# Patient Record
Sex: FEMALE | Race: WHITE | NOT HISPANIC OR LATINO
[De-identification: names, ages, dates, MRNs, and addresses within clinical notes are randomized per-mention and may not be internally consistent; named-entity substitution may affect disease eponyms.]

---

## 2018-08-20 ENCOUNTER — RESULT REVIEW (OUTPATIENT)
Age: 73
End: 2018-08-20

## 2019-08-22 ENCOUNTER — APPOINTMENT (OUTPATIENT)
Dept: GASTROENTEROLOGY | Facility: CLINIC | Age: 74
End: 2019-08-22
Payer: COMMERCIAL

## 2019-08-22 VITALS
HEIGHT: 61 IN | HEART RATE: 68 BPM | SYSTOLIC BLOOD PRESSURE: 121 MMHG | WEIGHT: 140 LBS | BODY MASS INDEX: 26.43 KG/M2 | DIASTOLIC BLOOD PRESSURE: 63 MMHG

## 2019-08-22 PROCEDURE — 99214 OFFICE O/P EST MOD 30 MIN: CPT

## 2019-08-22 NOTE — PHYSICAL EXAM
[General Appearance - In No Acute Distress] : in no acute distress [General Appearance - Alert] : alert [Sclera] : the sclera and conjunctiva were normal [PERRL With Normal Accommodation] : pupils were equal in size, round, and reactive to light [Extraocular Movements] : extraocular movements were intact [Oropharynx] : the oropharynx was normal [Outer Ear] : the ears and nose were normal in appearance [Neck Appearance] : the appearance of the neck was normal [Jugular Venous Distention Increased] : there was no jugular-venous distention [Neck Cervical Mass (___cm)] : no neck mass was observed [Thyroid Diffuse Enlargement] : the thyroid was not enlarged [Thyroid Nodule] : there were no palpable thyroid nodules [Heart Rate And Rhythm] : heart rate was normal and rhythm regular [Auscultation Breath Sounds / Voice Sounds] : lungs were clear to auscultation bilaterally [Heart Sounds] : normal S1 and S2 [Heart Sounds Gallop] : no gallops [Murmurs] : no murmurs [Heart Sounds Pericardial Friction Rub] : no pericardial rub [Bowel Sounds] : normal bowel sounds [Abdomen Soft] : soft [] : no hepato-splenomegaly [Abdomen Tenderness] : non-tender [No CVA Tenderness] : no ~M costovertebral angle tenderness [Abdomen Mass (___ Cm)] : no abdominal mass palpated [Abnormal Walk] : normal gait [No Spinal Tenderness] : no spinal tenderness [Musculoskeletal - Swelling] : no joint swelling seen [Motor Tone] : muscle strength and tone were normal [Nail Clubbing] : no clubbing  or cyanosis of the fingernails

## 2019-08-22 NOTE — HISTORY OF PRESENT ILLNESS
[FreeTextEntry1] : Patient has a history of elevated liver function tests and sonogram showed fatty liver most recent liver tests are within normal limits she has been able to watch her weight and alcohol use she consumes 1-2 drinks per night on occasion no dysphasia no heartburn no abdominal cramps no rectal bleeding

## 2019-08-22 NOTE — ASSESSMENT
[FreeTextEntry1] : Sonogram suggesting fatty liver we'll continue to avoid excess alcohol and maintain her weight at this level followup in 6 months

## 2020-06-04 ENCOUNTER — APPOINTMENT (OUTPATIENT)
Dept: GASTROENTEROLOGY | Facility: CLINIC | Age: 75
End: 2020-06-04
Payer: COMMERCIAL

## 2020-06-04 PROCEDURE — 99214 OFFICE O/P EST MOD 30 MIN: CPT | Mod: 95

## 2020-06-04 RX ORDER — LORATADINE 5 MG/5 ML
SOLUTION, ORAL ORAL
Refills: 0 | Status: ACTIVE | COMMUNITY

## 2020-06-04 RX ORDER — ATORVASTATIN CALCIUM 20 MG/1
20 TABLET, FILM COATED ORAL
Refills: 0 | Status: ACTIVE | COMMUNITY

## 2020-06-04 RX ORDER — ENALAPRIL MALEATE 5 MG/1
5 TABLET ORAL
Refills: 0 | Status: ACTIVE | COMMUNITY

## 2020-06-04 RX ORDER — ASPIRIN 81 MG/1
81 TABLET, CHEWABLE ORAL
Refills: 0 | Status: ACTIVE | COMMUNITY

## 2020-06-04 RX ORDER — SOFT LENS ADJUNCTIVE SOLUTIONS
DROPS MISCELLANEOUS
Refills: 0 | Status: ACTIVE | COMMUNITY

## 2020-06-04 RX ORDER — BISMUTH SUBSALICYLATE 525 MG/1
TABLET ORAL
Refills: 0 | Status: ACTIVE | COMMUNITY

## 2021-03-25 ENCOUNTER — APPOINTMENT (OUTPATIENT)
Dept: GASTROENTEROLOGY | Facility: CLINIC | Age: 76
End: 2021-03-25
Payer: COMMERCIAL

## 2021-03-25 VITALS
HEART RATE: 73 BPM | WEIGHT: 130.31 LBS | DIASTOLIC BLOOD PRESSURE: 64 MMHG | BODY MASS INDEX: 24.6 KG/M2 | SYSTOLIC BLOOD PRESSURE: 126 MMHG | HEIGHT: 61 IN

## 2021-03-25 PROCEDURE — 99214 OFFICE O/P EST MOD 30 MIN: CPT

## 2021-03-25 PROCEDURE — 99072 ADDL SUPL MATRL&STAF TM PHE: CPT

## 2021-03-25 RX ORDER — SODIUM SULFATE, POTASSIUM SULFATE, MAGNESIUM SULFATE 17.5; 3.13; 1.6 G/ML; G/ML; G/ML
17.5-3.13-1.6 SOLUTION, CONCENTRATE ORAL
Qty: 1 | Refills: 0 | Status: ACTIVE | COMMUNITY
Start: 2021-03-25 | End: 1900-01-01

## 2021-03-25 NOTE — ASSESSMENT
[FreeTextEntry1] : #1  blood per rectum, likely secondary to constipation. Will continue use of milk of magnesia caplets and schedule colonoscopy as her last one was 5 years ago\par #2 non-alcoholic fatty liver disease nafld we'll check abdominal sonogram. Recent blood work was normal. We'll check-[place] fibro

## 2021-03-25 NOTE — HISTORY OF PRESENT ILLNESS
[FreeTextEntry1] : Patient has been having occasional bright red blood per rectum. The amount is small. The frequency is one time a week. She denies any change of bowel habits. She takes magnesium, successfully for long-standing constipation. She has lost weight intentionally and has a history of fatty liver, which was felt secondary to alcohol use, which he has now decreased. She generally is feeling well. She uses avoidance for reflux and does not need any H2 blocker, which she has in the past

## 2021-03-25 NOTE — PHYSICAL EXAM
[General Appearance - Alert] : alert [General Appearance - In No Acute Distress] : in no acute distress [Sclera] : the sclera and conjunctiva were normal [PERRL With Normal Accommodation] : pupils were equal in size, round, and reactive to light [Extraocular Movements] : extraocular movements were intact [Outer Ear] : the ears and nose were normal in appearance [Oropharynx] : the oropharynx was normal [Neck Appearance] : the appearance of the neck was normal [Neck Cervical Mass (___cm)] : no neck mass was observed [Jugular Venous Distention Increased] : there was no jugular-venous distention [Thyroid Diffuse Enlargement] : the thyroid was not enlarged [Thyroid Nodule] : there were no palpable thyroid nodules [Auscultation Breath Sounds / Voice Sounds] : lungs were clear to auscultation bilaterally [Heart Rate And Rhythm] : heart rate was normal and rhythm regular [Heart Sounds] : normal S1 and S2 [Heart Sounds Gallop] : no gallops [Murmurs] : no murmurs [Heart Sounds Pericardial Friction Rub] : no pericardial rub [Full Pulse] : the pedal pulses are present [Edema] : there was no peripheral edema [Bowel Sounds] : normal bowel sounds [Abdomen Soft] : soft [Abdomen Tenderness] : non-tender [] : no hepato-splenomegaly [Abdomen Mass (___ Cm)] : no abdominal mass palpated [No CVA Tenderness] : no ~M costovertebral angle tenderness [No Spinal Tenderness] : no spinal tenderness [Abnormal Walk] : normal gait [Nail Clubbing] : no clubbing  or cyanosis of the fingernails [Musculoskeletal - Swelling] : no joint swelling seen [Motor Tone] : muscle strength and tone were normal [Deep Tendon Reflexes (DTR)] : deep tendon reflexes were 2+ and symmetric [Sensation] : the sensory exam was normal to light touch and pinprick [No Focal Deficits] : no focal deficits [Oriented To Time, Place, And Person] : oriented to person, place, and time [Impaired Insight] : insight and judgment were intact [Affect] : the affect was normal

## 2021-04-15 RX ORDER — SODIUM SULFATE, MAGNESIUM SULFATE, AND POTASSIUM CHLORIDE 17.75; 2.7; 2.25 G/1; G/1; G/1
1479-225-188 TABLET ORAL
Qty: 24 | Refills: 0 | Status: ACTIVE | COMMUNITY
Start: 2021-04-15 | End: 1900-01-01

## 2021-04-17 ENCOUNTER — APPOINTMENT (OUTPATIENT)
Dept: DISASTER EMERGENCY | Facility: CLINIC | Age: 76
End: 2021-04-17

## 2021-04-17 DIAGNOSIS — Z01.818 ENCOUNTER FOR OTHER PREPROCEDURAL EXAMINATION: ICD-10-CM

## 2021-04-17 LAB — SARS-COV-2 N GENE NPH QL NAA+PROBE: NOT DETECTED

## 2021-04-21 ENCOUNTER — APPOINTMENT (OUTPATIENT)
Dept: GASTROENTEROLOGY | Facility: AMBULATORY MEDICAL SERVICES | Age: 76
End: 2021-04-21
Payer: COMMERCIAL

## 2021-04-21 PROCEDURE — 45378 DIAGNOSTIC COLONOSCOPY: CPT

## 2021-05-20 ENCOUNTER — APPOINTMENT (OUTPATIENT)
Dept: GASTROENTEROLOGY | Facility: CLINIC | Age: 76
End: 2021-05-20
Payer: COMMERCIAL

## 2021-05-20 VITALS
HEART RATE: 71 BPM | HEIGHT: 61 IN | BODY MASS INDEX: 24.73 KG/M2 | DIASTOLIC BLOOD PRESSURE: 70 MMHG | SYSTOLIC BLOOD PRESSURE: 120 MMHG | WEIGHT: 131 LBS | TEMPERATURE: 98 F

## 2021-05-20 PROCEDURE — 99213 OFFICE O/P EST LOW 20 MIN: CPT

## 2021-05-20 NOTE — ASSESSMENT
[FreeTextEntry1] : Elevated alpha-fetoprotein we'll schedule a CAT scan of the abdomen with IV contrast

## 2021-05-20 NOTE — HISTORY OF PRESENT ILLNESS
[FreeTextEntry1] : Patient has been diagnosed with fatty liver as a result of history of hypercholesterolemia, which has been followed and a recent alpha-fetoprotein was minimally elevated at 6.5, normal being 6.1. A sonogram did not show any liver lesion. [place] fibro showed slight elevation of fibrosis.she is feeling quite well.

## 2021-10-12 ENCOUNTER — APPOINTMENT (OUTPATIENT)
Dept: GASTROENTEROLOGY | Facility: CLINIC | Age: 76
End: 2021-10-12
Payer: MEDICARE

## 2021-10-12 VITALS
DIASTOLIC BLOOD PRESSURE: 78 MMHG | BODY MASS INDEX: 27 KG/M2 | HEIGHT: 61 IN | SYSTOLIC BLOOD PRESSURE: 112 MMHG | WEIGHT: 143 LBS | HEART RATE: 65 BPM

## 2021-10-12 PROCEDURE — 99213 OFFICE O/P EST LOW 20 MIN: CPT

## 2021-10-12 NOTE — PHYSICAL EXAM
[General Appearance - Alert] : alert [General Appearance - In No Acute Distress] : in no acute distress [Sclera] : the sclera and conjunctiva were normal [PERRL With Normal Accommodation] : pupils were equal in size, round, and reactive to light [Extraocular Movements] : extraocular movements were intact [Outer Ear] : the ears and nose were normal in appearance [Oropharynx] : the oropharynx was normal [Neck Appearance] : the appearance of the neck was normal [Neck Cervical Mass (___cm)] : no neck mass was observed [Jugular Venous Distention Increased] : there was no jugular-venous distention [Thyroid Diffuse Enlargement] : the thyroid was not enlarged [Thyroid Nodule] : there were no palpable thyroid nodules [Auscultation Breath Sounds / Voice Sounds] : lungs were clear to auscultation bilaterally [Heart Rate And Rhythm] : heart rate was normal and rhythm regular [Heart Sounds] : normal S1 and S2 [Heart Sounds Gallop] : no gallops [Murmurs] : no murmurs [Heart Sounds Pericardial Friction Rub] : no pericardial rub [Full Pulse] : the pedal pulses are present [Edema] : there was no peripheral edema [Bowel Sounds] : normal bowel sounds [Abdomen Soft] : soft [Abdomen Tenderness] : non-tender [] : no hepato-splenomegaly [Abdomen Mass (___ Cm)] : no abdominal mass palpated [No CVA Tenderness] : no ~M costovertebral angle tenderness [No Spinal Tenderness] : no spinal tenderness [Abnormal Walk] : normal gait [Musculoskeletal - Swelling] : no joint swelling seen [Nail Clubbing] : no clubbing  or cyanosis of the fingernails [Motor Tone] : muscle strength and tone were normal [Deep Tendon Reflexes (DTR)] : deep tendon reflexes were 2+ and symmetric [No Focal Deficits] : no focal deficits [Sensation] : the sensory exam was normal to light touch and pinprick [Oriented To Time, Place, And Person] : oriented to person, place, and time [Impaired Insight] : insight and judgment were intact [Affect] : the affect was normal

## 2021-10-12 NOTE — HISTORY OF PRESENT ILLNESS
[FreeTextEntry1] : Cause of a minimally elevated alpha-fetoprotein and a CAT scan was done with IV contrast and wears negative of the abdomen. She has done well. Her weight is stable. She has attempted to lose some weight, but minimally so. She denies much alcohol use and feeling generally well

## 2022-04-28 ENCOUNTER — APPOINTMENT (OUTPATIENT)
Dept: GASTROENTEROLOGY | Facility: CLINIC | Age: 77
End: 2022-04-28
Payer: MEDICARE

## 2022-04-28 VITALS
WEIGHT: 137 LBS | DIASTOLIC BLOOD PRESSURE: 74 MMHG | HEART RATE: 69 BPM | HEIGHT: 61 IN | SYSTOLIC BLOOD PRESSURE: 105 MMHG | BODY MASS INDEX: 25.86 KG/M2

## 2022-04-28 LAB — AFP-TM SERPL-MCNC: 7.9 NG/ML

## 2022-04-28 PROCEDURE — 99213 OFFICE O/P EST LOW 20 MIN: CPT

## 2022-04-28 NOTE — HISTORY OF PRESENT ILLNESS
[FreeTextEntry1] : Was here 6 months ago for an elevated alpha-fetoprotein, which was borderline. Workup included CAT scan of the abdomen and liver, which was unrevealing for any hepatic lesions. She has had a history of borderline fatty liver. She denies any abdominal pain. She denies any unexplained weight loss. She denies any ankle edema, or bleeding

## 2022-04-28 NOTE — PHYSICAL EXAM
[General Appearance - Alert] : alert [General Appearance - In No Acute Distress] : in no acute distress [Sclera] : the sclera and conjunctiva were normal [PERRL With Normal Accommodation] : pupils were equal in size, round, and reactive to light [Extraocular Movements] : extraocular movements were intact [Outer Ear] : the ears and nose were normal in appearance [Oropharynx] : the oropharynx was normal [Neck Cervical Mass (___cm)] : no neck mass was observed [Neck Appearance] : the appearance of the neck was normal [Jugular Venous Distention Increased] : there was no jugular-venous distention [Thyroid Diffuse Enlargement] : the thyroid was not enlarged [Thyroid Nodule] : there were no palpable thyroid nodules [Auscultation Breath Sounds / Voice Sounds] : lungs were clear to auscultation bilaterally [Heart Rate And Rhythm] : heart rate was normal and rhythm regular [Heart Sounds] : normal S1 and S2 [Heart Sounds Gallop] : no gallops [Murmurs] : no murmurs [Heart Sounds Pericardial Friction Rub] : no pericardial rub [Full Pulse] : the pedal pulses are present [Edema] : there was no peripheral edema [Bowel Sounds] : normal bowel sounds [Abdomen Soft] : soft [Abdomen Tenderness] : non-tender [] : no hepato-splenomegaly [Abdomen Mass (___ Cm)] : no abdominal mass palpated [No CVA Tenderness] : no ~M costovertebral angle tenderness [No Spinal Tenderness] : no spinal tenderness [Abnormal Walk] : normal gait [Nail Clubbing] : no clubbing  or cyanosis of the fingernails [Motor Tone] : muscle strength and tone were normal [Musculoskeletal - Swelling] : no joint swelling seen [Deep Tendon Reflexes (DTR)] : deep tendon reflexes were 2+ and symmetric [Sensation] : the sensory exam was normal to light touch and pinprick [No Focal Deficits] : no focal deficits [Oriented To Time, Place, And Person] : oriented to person, place, and time [Affect] : the affect was normal [Impaired Insight] : insight and judgment were intact

## 2023-04-25 ENCOUNTER — OFFICE (OUTPATIENT)
Dept: URBAN - METROPOLITAN AREA CLINIC 90 | Facility: CLINIC | Age: 78
Setting detail: OPHTHALMOLOGY
End: 2023-04-25
Payer: MEDICARE

## 2023-04-25 DIAGNOSIS — H01.001: ICD-10-CM

## 2023-04-25 DIAGNOSIS — H18.513: ICD-10-CM

## 2023-04-25 DIAGNOSIS — H44.23: ICD-10-CM

## 2023-04-25 DIAGNOSIS — H01.004: ICD-10-CM

## 2023-04-25 DIAGNOSIS — E11.9: ICD-10-CM

## 2023-04-25 DIAGNOSIS — D31.31: ICD-10-CM

## 2023-04-25 DIAGNOSIS — H26.493: ICD-10-CM

## 2023-04-25 DIAGNOSIS — H40.013: ICD-10-CM

## 2023-04-25 DIAGNOSIS — Z96.1: ICD-10-CM

## 2023-04-25 PROCEDURE — 92133 CPTRZD OPH DX IMG PST SGM ON: CPT | Performed by: OPHTHALMOLOGY

## 2023-04-25 PROCEDURE — 92014 COMPRE OPH EXAM EST PT 1/>: CPT | Performed by: OPHTHALMOLOGY

## 2023-04-25 ASSESSMENT — REFRACTION_CURRENTRX
OD_VPRISM_DIRECTION: PROGS
OD_AXIS: 001
OD_CYLINDER: -1.25
OS_VPRISM_DIRECTION: PROGS
OD_AXIS: 171
OD_SPHERE: +0.50
OD_SPHERE: +0.50
OS_AXIS: 001
OD_ADD: +2.25
OS_SPHERE: +0.50
OS_CYLINDER: -0.50
OS_CYLINDER: -0.50
OS_AXIS: 009
OS_AXIS: 004
OS_ADD: +2.25
OS_OVR_VA: 20/
OS_ADD: +2.25
OS_OVR_VA: 20/
OS_CYLINDER: -0.50
OS_SPHERE: +0.50
OS_ADD: +2.50
OD_SPHERE: +0.75
OD_OVR_VA: 20/
OS_VPRISM_DIRECTION: PROGS
OS_OVR_VA: 20/
OD_CYLINDER: -1.25
OD_VPRISM_DIRECTION: PROGS
OD_OVR_VA: 20/
OD_OVR_VA: 20/
OD_ADD: +2.50
OD_CYLINDER: -1.25
OD_ADD: +2.25
OS_SPHERE: +0.50
OD_AXIS: 172

## 2023-04-25 ASSESSMENT — KERATOMETRY
OS_AXISANGLE_DEGREES: 093
OD_K1POWER_DIOPTERS: 45.25
OS_K2POWER_DIOPTERS: 46.25
METHOD_AUTO_MANUAL: AUTO
OD_AXISANGLE_DEGREES: 090
OD_K2POWER_DIOPTERS: 47.25
OS_K1POWER_DIOPTERS: 45.50

## 2023-04-25 ASSESSMENT — REFRACTION_MANIFEST
OS_ADD: +2.75
OS_SPHERE: +0.25
OD_ADD: +2.75
OD_AXIS: 175
OS_ADD: +2.75
OD_CYLINDER: -1.25
OS_VA1: 20/25-2
OS_SPHERE: +0.25
OD_VA1: 20/25+3
OD_CYLINDER: -1.25
OS_CYLINDER: -0.50
OD_VA1: 20/25+3
OD_ADD: +2.75
OS_AXIS: 010
OD_AXIS: 175
OU_VA: 20/20
OD_SPHERE: +0.50
OS_VA1: 20/25-2
OS_CYLINDER: -0.50
OS_AXIS: 010
OU_VA: 20/20
OD_SPHERE: +0.50

## 2023-04-25 ASSESSMENT — REFRACTION_AUTOREFRACTION
OS_CYLINDER: -0.75
OS_SPHERE: +0.50
OD_CYLINDER: -1.75
OD_SPHERE: +0.50
OD_AXIS: 174
OS_AXIS: 011

## 2023-04-25 ASSESSMENT — AXIALLENGTH_DERIVED
OD_AL: 22.6682
OD_AL: 22.7584
OS_AL: 22.7055
OS_AL: 22.7507
OD_AL: 22.6682
OS_AL: 22.7507

## 2023-04-25 ASSESSMENT — CORNEAL DYSTROPHY - POSTERIOR
OS_POSTERIORDYSTROPHY: T GUTTATA
OD_POSTERIORDYSTROPHY: T GUTTATA

## 2023-04-25 ASSESSMENT — SPHEQUIV_DERIVED
OD_SPHEQUIV: -0.125
OD_SPHEQUIV: -0.375
OS_SPHEQUIV: 0.125
OS_SPHEQUIV: 0
OS_SPHEQUIV: 0
OD_SPHEQUIV: -0.125

## 2023-04-25 ASSESSMENT — LID EXAM ASSESSMENTS
OD_BLEPHARITIS: RUL 1+
OS_BLEPHARITIS: LUL 1+

## 2023-04-25 ASSESSMENT — VISUAL ACUITY
OD_BCVA: 20/20-1
OS_BCVA: 20/20

## 2023-04-25 ASSESSMENT — CONFRONTATIONAL VISUAL FIELD TEST (CVF)
OD_FINDINGS: FULL
OS_FINDINGS: FULL

## 2023-04-25 ASSESSMENT — TONOMETRY
OS_IOP_MMHG: 20
OD_IOP_MMHG: 20

## 2023-05-08 ENCOUNTER — APPOINTMENT (OUTPATIENT)
Dept: ORTHOPEDIC SURGERY | Facility: CLINIC | Age: 78
End: 2023-05-08
Payer: MEDICARE

## 2023-05-08 DIAGNOSIS — M76.891 OTHER SPECIFIED ENTHESOPATHIES OF RIGHT LOWER LIMB, EXCLUDING FOOT: ICD-10-CM

## 2023-05-08 PROCEDURE — 73564 X-RAY EXAM KNEE 4 OR MORE: CPT | Mod: 50

## 2023-05-08 PROCEDURE — 99204 OFFICE O/P NEW MOD 45 MIN: CPT

## 2023-05-08 PROCEDURE — 73502 X-RAY EXAM HIP UNI 2-3 VIEWS: CPT

## 2023-05-08 RX ORDER — MELOXICAM 15 MG/1
15 TABLET ORAL
Qty: 30 | Refills: 1 | Status: ACTIVE | COMMUNITY
Start: 2023-05-08 | End: 1900-01-01

## 2023-05-08 NOTE — ASSESSMENT
[FreeTextEntry1] : 77F p/w R abductor tendonitis, PF OA and patellar tendonitis\par \par Begin PT\par Meloxicam for pain as needed\par return 6-8 weeks\par \par The patient was advised of the diagnosis. The natural history of the pathology was explained in full to the patient in layman's terms. All questions were answered. The risks and benefits of surgical and non-surgical treatment alternatives were explained in full to the patient. \par

## 2023-05-08 NOTE — HISTORY OF PRESENT ILLNESS
[6] : 6 [3] : 3 [Dull/Aching] : dull/aching [de-identified] : 77F p/w jone knee and hip pain. She has had pain for years, having trouble with stairs. She complains of lateral right hip pain which keeps her up at night. She denies radicular pains or numbness [] : no [FreeTextEntry1] : R hip B knees [FreeTextEntry5] : Janie Elder is a 77 Y.O female coming in with right hip and bilateral knee pain. No reported injury. Pain in the knees for the past 30 years, pain in the hip started last month. Pain Occasionally travels into the thigh, denies numbness/tingling.

## 2023-05-08 NOTE — PHYSICAL EXAM
[2+] : dorsalis pedis pulse: 2+ [Right] : right hip with pelvis [There are no fractures, subluxations or dislocations. No significant abnormalities are seen] : There are no fractures, subluxations or dislocations. No significant abnormalities are seen [No loss of surgical correlation. Bony alignment acceptable. Hardware in appropriate position] : No loss of surgical correlation. Bony alignment acceptable. Hardware in appropriate position [NL (140)] : flexion 140 degrees [NL (0)] : extension 0 degrees [5___] : hamstring 5[unfilled]/5 [] : light touch is intact throughout [Bilateral] : knee bilaterally [Moderate patellofemoral OA] : Moderate patellofemoral OA

## 2023-06-12 ENCOUNTER — APPOINTMENT (OUTPATIENT)
Dept: ORTHOPEDIC SURGERY | Facility: CLINIC | Age: 78
End: 2023-06-12
Payer: MEDICARE

## 2023-06-12 VITALS — WEIGHT: 137 LBS | HEIGHT: 61 IN | BODY MASS INDEX: 25.86 KG/M2

## 2023-06-12 DIAGNOSIS — M76.51 PATELLAR TENDINITIS, RIGHT KNEE: ICD-10-CM

## 2023-06-12 DIAGNOSIS — I10 ESSENTIAL (PRIMARY) HYPERTENSION: ICD-10-CM

## 2023-06-12 DIAGNOSIS — M76.52 PATELLAR TENDINITIS, LEFT KNEE: ICD-10-CM

## 2023-06-12 DIAGNOSIS — E78.00 PURE HYPERCHOLESTEROLEMIA, UNSPECIFIED: ICD-10-CM

## 2023-06-12 PROCEDURE — 99214 OFFICE O/P EST MOD 30 MIN: CPT

## 2023-06-12 NOTE — HISTORY OF PRESENT ILLNESS
[3] : 3 [0] : 0 [Dull/Aching] : dull/aching [Localized] : localized [de-identified] : 77F p/w jone knee and hip pain. She has had pain for years, having trouble with stairs. She complains of lateral right hip pain which keeps her up at night. She denies radicular pains or numbness\par \par 6/12/23 f/u jone knee and hip, hip is better but would like to start PT for knees, here for an rx today [FreeTextEntry1] : R hip B/L Knees [FreeTextEntry3] : N/A Chronic [FreeTextEntry5] : 78 y/o RHD F presents with many years of atraumatic pain due to HX of Patellar tendonitis and hip abductor tendonitis. pt states condition has improved since last visit

## 2023-06-12 NOTE — PHYSICAL EXAM
[2+] : dorsalis pedis pulse: 2+ [Right] : right hip with pelvis [There are no fractures, subluxations or dislocations. No significant abnormalities are seen] : There are no fractures, subluxations or dislocations. No significant abnormalities are seen [No loss of surgical correlation. Bony alignment acceptable. Hardware in appropriate position] : No loss of surgical correlation. Bony alignment acceptable. Hardware in appropriate position [NL (140)] : flexion 140 degrees [NL (0)] : extension 0 degrees [5___] : hamstring 5[unfilled]/5 [Bilateral] : knee bilaterally [Moderate patellofemoral OA] : Moderate patellofemoral OA [] : no erythema

## 2023-06-22 ENCOUNTER — APPOINTMENT (OUTPATIENT)
Dept: GASTROENTEROLOGY | Facility: CLINIC | Age: 78
End: 2023-06-22

## 2023-06-23 ENCOUNTER — APPOINTMENT (OUTPATIENT)
Dept: GASTROENTEROLOGY | Facility: CLINIC | Age: 78
End: 2023-06-23
Payer: MEDICARE

## 2023-06-23 VITALS
BODY MASS INDEX: 26.43 KG/M2 | HEIGHT: 61 IN | WEIGHT: 140 LBS | SYSTOLIC BLOOD PRESSURE: 142 MMHG | DIASTOLIC BLOOD PRESSURE: 75 MMHG | HEART RATE: 58 BPM

## 2023-06-23 DIAGNOSIS — R77.2 ABNORMALITY OF ALPHAFETOPROTEIN: ICD-10-CM

## 2023-06-23 DIAGNOSIS — K59.00 CONSTIPATION, UNSPECIFIED: ICD-10-CM

## 2023-06-23 DIAGNOSIS — K58.1 IRRITABLE BOWEL SYNDROME WITH CONSTIPATION: ICD-10-CM

## 2023-06-23 DIAGNOSIS — K62.5 HEMORRHAGE OF ANUS AND RECTUM: ICD-10-CM

## 2023-06-23 PROCEDURE — 99214 OFFICE O/P EST MOD 30 MIN: CPT

## 2023-06-23 NOTE — ASSESSMENT
[FreeTextEntry1] : 76yo female with IBS-C\par \par bleeding likely hemorrhoidal - will follow. will check colonoscopy if persists/increases\par add miralax daily to qod and titrate to response\par \par will check afp as elevated in the past\par will check cbc, cmp for fatty liver diease

## 2023-06-23 NOTE — PHYSICAL EXAM

## 2023-06-23 NOTE — HISTORY OF PRESENT ILLNESS
[FreeTextEntry1] : 78yo female seen f/u IBS-C\par \par She has been maintained on MOM for years but had decreased to QOD due to increased mg levels\par \par Now with intermittent increased constipation with bloating\par She had rectal bleeding several weeks ago and resolved - occurred at time of increased constipation and straining

## 2023-06-24 LAB
AFP-TM SERPL-MCNC: 7.2 NG/ML
ALBUMIN SERPL ELPH-MCNC: 4.4 G/DL
ALP BLD-CCNC: 76 U/L
ALT SERPL-CCNC: 24 U/L
ANION GAP SERPL CALC-SCNC: 12 MMOL/L
AST SERPL-CCNC: 26 U/L
BILIRUB SERPL-MCNC: 0.9 MG/DL
BUN SERPL-MCNC: 21 MG/DL
CALCIUM SERPL-MCNC: 9.6 MG/DL
CHLORIDE SERPL-SCNC: 107 MMOL/L
CO2 SERPL-SCNC: 25 MMOL/L
CREAT SERPL-MCNC: 1.07 MG/DL
EGFR: 54 ML/MIN/1.73M2
GLUCOSE SERPL-MCNC: 124 MG/DL
POTASSIUM SERPL-SCNC: 4.6 MMOL/L
PROT SERPL-MCNC: 6.9 G/DL
SODIUM SERPL-SCNC: 144 MMOL/L

## 2023-08-28 ENCOUNTER — APPOINTMENT (OUTPATIENT)
Dept: ORTHOPEDIC SURGERY | Facility: CLINIC | Age: 78
End: 2023-08-28

## 2023-10-19 ENCOUNTER — APPOINTMENT (OUTPATIENT)
Dept: GASTROENTEROLOGY | Facility: CLINIC | Age: 78
End: 2023-10-19
Payer: MEDICARE

## 2023-10-19 VITALS
WEIGHT: 134 LBS | SYSTOLIC BLOOD PRESSURE: 124 MMHG | DIASTOLIC BLOOD PRESSURE: 70 MMHG | BODY MASS INDEX: 25.3 KG/M2 | HEIGHT: 61 IN

## 2023-10-19 DIAGNOSIS — Z09 ENCOUNTER FOR FOLLOW-UP EXAMINATION AFTER COMPLETED TREATMENT FOR CONDITIONS OTHER THAN MALIGNANT NEOPLASM: ICD-10-CM

## 2023-10-19 PROCEDURE — 99214 OFFICE O/P EST MOD 30 MIN: CPT

## 2023-10-26 ENCOUNTER — OFFICE (OUTPATIENT)
Dept: URBAN - METROPOLITAN AREA CLINIC 90 | Facility: CLINIC | Age: 78
Setting detail: OPHTHALMOLOGY
End: 2023-10-26
Payer: MEDICARE

## 2023-10-26 DIAGNOSIS — H18.513: ICD-10-CM

## 2023-10-26 DIAGNOSIS — D31.31: ICD-10-CM

## 2023-10-26 DIAGNOSIS — H40.013: ICD-10-CM

## 2023-10-26 DIAGNOSIS — H26.493: ICD-10-CM

## 2023-10-26 PROCEDURE — 92014 COMPRE OPH EXAM EST PT 1/>: CPT | Performed by: OPHTHALMOLOGY

## 2023-10-26 ASSESSMENT — REFRACTION_MANIFEST
OS_ADD: +2.75
OD_SPHERE: +0.50
OS_SPHERE: +0.25
OD_ADD: +2.75
OS_VA1: 20/25-2
OS_AXIS: 010
OD_VA1: 20/25+3
OU_VA: 20/20
OD_CYLINDER: -1.25
OD_AXIS: 175
OS_VA1: 20/25-2
OU_VA: 20/20
OS_CYLINDER: -0.50
OD_ADD: +2.75
OD_AXIS: 175
OD_VA1: 20/25+3
OS_AXIS: 010
OS_ADD: +2.75
OD_SPHERE: +0.50
OS_SPHERE: +0.25
OS_CYLINDER: -0.50
OD_CYLINDER: -1.25

## 2023-10-26 ASSESSMENT — KERATOMETRY
OD_K1POWER_DIOPTERS: 45.50
METHOD_AUTO_MANUAL: AUTO
OD_K2POWER_DIOPTERS: 47.00
OS_K1POWER_DIOPTERS: 45.50
OD_AXISANGLE_DEGREES: 091
OS_K2POWER_DIOPTERS: 46.50
OS_AXISANGLE_DEGREES: 093

## 2023-10-26 ASSESSMENT — REFRACTION_CURRENTRX
OS_AXIS: 009
OS_ADD: +2.25
OS_AXIS: 001
OD_AXIS: 171
OD_CYLINDER: -1.25
OS_SPHERE: +0.25
OD_ADD: +2.25
OS_SPHERE: +0.50
OS_ADD: +2.25
OS_AXIS: 179
OD_AXIS: 172
OS_OVR_VA: 20/
OS_OVR_VA: 20/
OS_VPRISM_DIRECTION: PROGS
OD_ADD: +2.50
OD_ADD: +2.25
OD_CYLINDER: -1.25
OS_VPRISM_DIRECTION: PROGS
OS_OVR_VA: 20/
OD_VPRISM_DIRECTION: PROGS
OS_AXIS: 004
OD_OVR_VA: 20/
OD_VPRISM_DIRECTION: PROGS
OS_SPHERE: +0.50
OS_ADD: +2.50
OS_CYLINDER: -0.50
OS_CYLINDER: -0.50
OD_VPRISM_DIRECTION: PROGS
OD_CYLINDER: -1.25
OD_CYLINDER: -1.25
OS_CYLINDER: -0.75
OS_CYLINDER: -0.50
OS_SPHERE: +0.50
OD_AXIS: 001
OD_OVR_VA: 20/
OS_VPRISM_DIRECTION: PROGS
OD_AXIS: 171
OS_ADD: +2.25
OD_SPHERE: +0.50
OD_SPHERE: +0.75
OD_OVR_VA: 20/
OD_SPHERE: +0.50
OD_SPHERE: +0.50
OD_ADD: +2.25

## 2023-10-26 ASSESSMENT — REFRACTION_AUTOREFRACTION
OS_SPHERE: +0.50
OD_AXIS: 178
OD_SPHERE: +0.25
OD_CYLINDER: -1.00
OS_CYLINDER: -0.25
OS_AXIS: 033

## 2023-10-26 ASSESSMENT — SPHEQUIV_DERIVED
OS_SPHEQUIV: 0.375
OD_SPHEQUIV: -0.125
OD_SPHEQUIV: -0.25
OS_SPHEQUIV: 0
OD_SPHEQUIV: -0.125
OS_SPHEQUIV: 0

## 2023-10-26 ASSESSMENT — TONOMETRY
OS_IOP_MMHG: 19
OD_IOP_MMHG: 19

## 2023-10-26 ASSESSMENT — CONFRONTATIONAL VISUAL FIELD TEST (CVF)
OS_FINDINGS: FULL
OD_FINDINGS: FULL

## 2023-10-26 ASSESSMENT — CORNEAL DYSTROPHY - POSTERIOR
OD_POSTERIORDYSTROPHY: T GUTTATA
OS_POSTERIORDYSTROPHY: T GUTTATA

## 2023-10-26 ASSESSMENT — LID EXAM ASSESSMENTS
OD_BLEPHARITIS: RUL 1+
OS_BLEPHARITIS: LUL 1+

## 2023-10-26 ASSESSMENT — AXIALLENGTH_DERIVED
OD_AL: 22.6682
OD_AL: 22.7132
OS_AL: 22.5735
OS_AL: 22.708
OD_AL: 22.6682
OS_AL: 22.708

## 2023-10-26 ASSESSMENT — VISUAL ACUITY
OS_BCVA: 20/20
OD_BCVA: 20/20-1

## 2023-12-07 ENCOUNTER — APPOINTMENT (OUTPATIENT)
Dept: GASTROENTEROLOGY | Facility: CLINIC | Age: 78
End: 2023-12-07
Payer: MEDICARE

## 2023-12-07 VITALS
HEIGHT: 61 IN | WEIGHT: 134 LBS | BODY MASS INDEX: 25.3 KG/M2 | SYSTOLIC BLOOD PRESSURE: 134 MMHG | DIASTOLIC BLOOD PRESSURE: 72 MMHG

## 2023-12-07 LAB
ANION GAP SERPL CALC-SCNC: 10 MMOL/L
BUN SERPL-MCNC: 16 MG/DL
CALCIUM SERPL-MCNC: 9.6 MG/DL
CHLORIDE SERPL-SCNC: 104 MMOL/L
CO2 SERPL-SCNC: 28 MMOL/L
CREAT SERPL-MCNC: 0.91 MG/DL
EGFR: 65 ML/MIN/1.73M2
GLUCOSE SERPL-MCNC: 99 MG/DL
POTASSIUM SERPL-SCNC: 4.2 MMOL/L
SODIUM SERPL-SCNC: 141 MMOL/L

## 2023-12-07 PROCEDURE — 99213 OFFICE O/P EST LOW 20 MIN: CPT

## 2024-04-23 ENCOUNTER — OFFICE (OUTPATIENT)
Dept: URBAN - METROPOLITAN AREA CLINIC 90 | Facility: CLINIC | Age: 79
Setting detail: OPHTHALMOLOGY
End: 2024-04-23
Payer: MEDICARE

## 2024-04-23 DIAGNOSIS — H18.513: ICD-10-CM

## 2024-04-23 DIAGNOSIS — H40.013: ICD-10-CM

## 2024-04-23 DIAGNOSIS — H01.001: ICD-10-CM

## 2024-04-23 DIAGNOSIS — Z96.1: ICD-10-CM

## 2024-04-23 DIAGNOSIS — H01.004: ICD-10-CM

## 2024-04-23 DIAGNOSIS — H26.493: ICD-10-CM

## 2024-04-23 PROCEDURE — 92012 INTRM OPH EXAM EST PATIENT: CPT | Performed by: OPHTHALMOLOGY

## 2024-04-23 PROCEDURE — 92133 CPTRZD OPH DX IMG PST SGM ON: CPT | Performed by: OPHTHALMOLOGY

## 2024-04-23 ASSESSMENT — LID EXAM ASSESSMENTS
OD_BLEPHARITIS: RUL 1+
OS_BLEPHARITIS: LUL 1+

## 2024-06-07 ENCOUNTER — APPOINTMENT (OUTPATIENT)
Dept: GASTROENTEROLOGY | Facility: CLINIC | Age: 79
End: 2024-06-07
Payer: MEDICARE

## 2024-06-07 VITALS
DIASTOLIC BLOOD PRESSURE: 72 MMHG | HEIGHT: 61 IN | BODY MASS INDEX: 26.24 KG/M2 | WEIGHT: 139 LBS | SYSTOLIC BLOOD PRESSURE: 132 MMHG

## 2024-06-07 DIAGNOSIS — R74.8 ABNORMAL LEVELS OF OTHER SERUM ENZYMES: ICD-10-CM

## 2024-06-07 DIAGNOSIS — K76.0 FATTY (CHANGE OF) LIVER, NOT ELSEWHERE CLASSIFIED: ICD-10-CM

## 2024-06-07 DIAGNOSIS — R17 UNSPECIFIED JAUNDICE: ICD-10-CM

## 2024-06-07 PROCEDURE — 99214 OFFICE O/P EST MOD 30 MIN: CPT

## 2024-06-07 NOTE — PHYSICAL EXAM
[Alert] : alert [Normal Voice/Communication] : normal voice/communication [Healthy Appearing] : healthy appearing [Sclera] : the sclera and conjunctiva were normal [Hearing Threshold Finger Rub Not Weber] : hearing was normal [Normal Lips/Gums] : the lips and gums were normal [Normal Appearance] : the appearance of the neck was normal [No Respiratory Distress] : no respiratory distress [Auscultation Breath Sounds / Voice Sounds] : lungs were clear to auscultation bilaterally [Heart Rate And Rhythm] : heart rate was normal and rhythm regular [Normal S1, S2] : normal S1 and S2 [Bowel Sounds] : normal bowel sounds [Abdomen Soft] : soft [Abdomen Tenderness] : non-tender [Abnormal Walk] : normal gait [Normal Color / Pigmentation] : normal skin color and pigmentation [Oriented To Time, Place, And Person] : oriented to person, place, and time

## 2024-06-09 NOTE — HISTORY OF PRESENT ILLNESS
[FreeTextEntry1] : Janie Elder is a 78-year-old female presents for 6-month follow up appointment. Pt initially seen back in Oct post hospitalization for PNA and Sepsis. Pt found to have elevated transaminases and LFTs, likely related to sepsis. Levels have since returned to normal. Latest labs drawn in May, revealed AST 20, ALK 62 and ALT 21. Pt states she has been doing well and feeling well. Pt also with referral from Endocrinology after Bilirubin of 1.4 and Direct Bilirubin of 0.3, suggesting US for elevation of liver. Pt with previous imaging indicating hepatic steatosis in 2019, however not addressed in repeat imaging in 2021. Previous colonoscopy, Dr. Anaya in 2021. Denies FMH of CRC. Denies bowel complaints, typically has bowel movements regularly without significant straining or overt bleeding such as melena or hematochezia. Denies upper GI symptoms such as GERD, nausea, vomiting, or dysphagia. Denies unintentional weight loss.

## 2024-06-09 NOTE — ASSESSMENT
[FreeTextEntry1] : 78-year-old female presents for 6-month follow up appointment. Latest labs drawn in May, revealed AST 20, ALK 62 and ALT 21. Pt also with referral from Endocrinology after Bilirubin of 1.4 and Direct Bilirubin of 0.3, suggesting US for elevation of liver. Pt with previous imaging indicating hepatic steatosis in 2019, however not addressed in repeat imaging in 2021.   Plan:  LFTs: Now WDL no further intervention at this time.  Elevated bilirubin: May be related to Gilbert's Syndrome, however, will obtain abdominal US for evaluation.  Fatty liver: Pt with previous imaging however not noted in both. Will send pt with repeat, if indicated will also suggest fibroscan to determine degree/grade.   Pt to call office with questions or concerns. Will call pt with results. Pt agrees to plan, all questions answered.    I, Dr. Brantley, personally performed the evaluation and management (E/M) services for this established patient who presents today with (a) new problem(s)/exacerbation of (an) existing condition(s). That E/M includes conducting the examination, assessing all new/exacerbated conditions, and establishing a new plan of care. Today, my NPP, Leatha Ace, was here to observe my evaluation and management services for this new problem/exacerbated condition to be followed going forward.

## 2024-06-19 ENCOUNTER — APPOINTMENT (OUTPATIENT)
Dept: ULTRASOUND IMAGING | Facility: CLINIC | Age: 79
End: 2024-06-19
Payer: MEDICARE

## 2024-06-19 ENCOUNTER — OUTPATIENT (OUTPATIENT)
Dept: OUTPATIENT SERVICES | Facility: HOSPITAL | Age: 79
LOS: 1 days | End: 2024-06-19
Payer: MEDICARE

## 2024-06-19 DIAGNOSIS — K76.0 FATTY (CHANGE OF) LIVER, NOT ELSEWHERE CLASSIFIED: ICD-10-CM

## 2024-06-19 PROCEDURE — 76700 US EXAM ABDOM COMPLETE: CPT

## 2024-06-19 PROCEDURE — 76700 US EXAM ABDOM COMPLETE: CPT | Mod: 26

## 2024-06-21 ENCOUNTER — NON-APPOINTMENT (OUTPATIENT)
Age: 79
End: 2024-06-21

## 2024-10-23 ENCOUNTER — APPOINTMENT (OUTPATIENT)
Dept: GASTROENTEROLOGY | Facility: CLINIC | Age: 79
End: 2024-10-23
Payer: MEDICARE

## 2024-10-23 VITALS
BODY MASS INDEX: 26.43 KG/M2 | SYSTOLIC BLOOD PRESSURE: 117 MMHG | HEIGHT: 61 IN | DIASTOLIC BLOOD PRESSURE: 82 MMHG | WEIGHT: 140 LBS

## 2024-10-23 DIAGNOSIS — Z71.9 COUNSELING, UNSPECIFIED: ICD-10-CM

## 2024-10-23 DIAGNOSIS — K59.00 CONSTIPATION, UNSPECIFIED: ICD-10-CM

## 2024-10-23 PROCEDURE — 99213 OFFICE O/P EST LOW 20 MIN: CPT

## 2024-12-12 ENCOUNTER — OFFICE (OUTPATIENT)
Facility: LOCATION | Age: 79
Setting detail: OPHTHALMOLOGY
End: 2024-12-12
Payer: MEDICARE

## 2024-12-12 DIAGNOSIS — H44.23: ICD-10-CM

## 2024-12-12 DIAGNOSIS — H18.513: ICD-10-CM

## 2024-12-12 DIAGNOSIS — D31.31: ICD-10-CM

## 2024-12-12 DIAGNOSIS — E11.9: ICD-10-CM

## 2024-12-12 DIAGNOSIS — H26.493: ICD-10-CM

## 2024-12-12 DIAGNOSIS — H40.013: ICD-10-CM

## 2024-12-12 DIAGNOSIS — H01.001: ICD-10-CM

## 2024-12-12 DIAGNOSIS — Z96.1: ICD-10-CM

## 2024-12-12 DIAGNOSIS — H01.004: ICD-10-CM

## 2024-12-12 PROCEDURE — 92014 COMPRE OPH EXAM EST PT 1/>: CPT | Performed by: OPHTHALMOLOGY

## 2024-12-12 ASSESSMENT — REFRACTION_CURRENTRX
OD_CYLINDER: -1.25
OD_VPRISM_DIRECTION: PROGS
OD_SPHERE: +0.50
OS_AXIS: 009
OS_VPRISM_DIRECTION: PROGS
OS_OVR_VA: 20/
OD_ADD: +2.25
OS_AXIS: 012
OS_VPRISM_DIRECTION: PROGS
OD_SPHERE: +0.50
OD_CYLINDER: -1.25
OD_SPHERE: +0.25
OD_ADD: +2.50
OD_AXIS: 171
OS_CYLINDER: -0.50
OD_VPRISM_DIRECTION: PROGS
OD_SPHERE: +0.50
OS_SPHERE: +0.50
OS_CYLINDER: -0.75
OD_CYLINDER: -1.25
OS_ADD: +2.50
OS_ADD: +2.25
OD_OVR_VA: 20/
OD_AXIS: 125
OS_AXIS: 179
OS_OVR_VA: 20/
OS_ADD: +2.50
OD_AXIS: 001
OS_AXIS: 003
OD_CYLINDER: -0.25
OD_ADD: +2.50
OS_CYLINDER: -0.50
OD_OVR_VA: 20/
OD_ADD: +2.50
OS_SPHERE: +0.25
OD_CYLINDER: -1.25
OS_OVR_VA: 20/
OD_AXIS: 171
OS_SPHERE: +0.50
OS_CYLINDER: -0.50
OS_ADD: +2.25
OD_AXIS: 172
OS_AXIS: 004
OS_CYLINDER: -0.75
OD_ADD: +2.25
OS_AXIS: 001
OD_OVR_VA: 20/
OS_SPHERE: +0.50
OS_ADD: +2.00
OS_CYLINDER: -0.50
OD_AXIS: 169
OS_SPHERE: +0.25
OD_SPHERE: +0.50
OD_CYLINDER: -1.25
OD_ADD: +2.25
OD_SPHERE: +0.75
OD_VPRISM_DIRECTION: PROGS
OS_ADD: +2.25
OS_VPRISM_DIRECTION: PROGS
OS_SPHERE: +0.50

## 2024-12-12 ASSESSMENT — REFRACTION_MANIFEST
OS_SPHERE: +0.25
OS_AXIS: 010
OD_CYLINDER: -1.25
OU_VA: 20/20
OS_SPHERE: +0.25
OS_ADD: +2.75
OD_ADD: +2.75
OD_ADD: +2.75
OS_CYLINDER: -0.50
OS_CYLINDER: -0.50
OS_AXIS: 010
OD_SPHERE: +0.50
OS_VA1: 20/25-2
OS_ADD: +2.75
OD_VA1: 20/25+3
OD_CYLINDER: -1.25
OD_SPHERE: +0.50
OS_VA1: 20/25-2
OU_VA: 20/20
OD_VA1: 20/25+3
OD_AXIS: 175
OD_AXIS: 175

## 2024-12-12 ASSESSMENT — KERATOMETRY
METHOD_AUTO_MANUAL: AUTO
OD_K2POWER_DIOPTERS: 47.25
OD_K1POWER_DIOPTERS: 45.75
OD_AXISANGLE_DEGREES: 087
OS_K2POWER_DIOPTERS: 46.75
OS_AXISANGLE_DEGREES: 089
OS_K1POWER_DIOPTERS: 45.75

## 2024-12-12 ASSESSMENT — TONOMETRY
OD_IOP_MMHG: 15
OS_IOP_MMHG: 15

## 2024-12-12 ASSESSMENT — REFRACTION_AUTOREFRACTION
OS_CYLINDER: -0.25
OD_SPHERE: +0.50
OD_AXIS: 170
OS_AXIS: 016
OS_SPHERE: +0.25
OD_CYLINDER: -1.00

## 2024-12-12 ASSESSMENT — VISUAL ACUITY
OD_BCVA: 20/20-2
OS_BCVA: 20/20

## 2024-12-12 ASSESSMENT — CONFRONTATIONAL VISUAL FIELD TEST (CVF)
OD_FINDINGS: FULL
OS_FINDINGS: FULL

## 2024-12-12 ASSESSMENT — LID EXAM ASSESSMENTS
OS_BLEPHARITIS: LUL 1+
OD_BLEPHARITIS: RUL 1+

## 2024-12-12 ASSESSMENT — CORNEAL DYSTROPHY - POSTERIOR
OD_POSTERIORDYSTROPHY: T GUTTATA
OS_POSTERIORDYSTROPHY: T GUTTATA

## 2025-05-17 ENCOUNTER — NON-APPOINTMENT (OUTPATIENT)
Age: 80
End: 2025-05-17

## 2025-06-12 ENCOUNTER — OFFICE (OUTPATIENT)
Facility: LOCATION | Age: 80
Setting detail: OPHTHALMOLOGY
End: 2025-06-12
Payer: MEDICARE

## 2025-06-12 DIAGNOSIS — H01.004: ICD-10-CM

## 2025-06-12 DIAGNOSIS — Z96.1: ICD-10-CM

## 2025-06-12 DIAGNOSIS — E11.9: ICD-10-CM

## 2025-06-12 DIAGNOSIS — D31.32: ICD-10-CM

## 2025-06-12 DIAGNOSIS — H01.001: ICD-10-CM

## 2025-06-12 DIAGNOSIS — D31.31: ICD-10-CM

## 2025-06-12 DIAGNOSIS — H40.013: ICD-10-CM

## 2025-06-12 DIAGNOSIS — H18.513: ICD-10-CM

## 2025-06-12 DIAGNOSIS — H26.493: ICD-10-CM

## 2025-06-12 DIAGNOSIS — H44.23: ICD-10-CM

## 2025-06-12 PROCEDURE — 92133 CPTRZD OPH DX IMG PST SGM ON: CPT | Performed by: OPHTHALMOLOGY

## 2025-06-12 PROCEDURE — 92014 COMPRE OPH EXAM EST PT 1/>: CPT | Performed by: OPHTHALMOLOGY

## 2025-06-12 PROCEDURE — 92083 EXTENDED VISUAL FIELD XM: CPT | Performed by: OPHTHALMOLOGY

## 2025-06-12 ASSESSMENT — REFRACTION_CURRENTRX
OD_SPHERE: +0.50
OD_VPRISM_DIRECTION: PROGS
OS_CYLINDER: 0.00
OD_SPHERE: +0.50
OS_CYLINDER: -0.75
OD_VPRISM_DIRECTION: PROGS
OD_SPHERE: +0.50
OS_SPHERE: +0.50
OS_CYLINDER: -0.75
OD_CYLINDER: -0.25
OS_AXIS: 003
OS_VPRISM_DIRECTION: PROGS
OD_CYLINDER: -1.25
OS_ADD: +2.00
OS_AXIS: 010
OD_CYLINDER: -1.25
OS_AXIS: 009
OD_ADD: +2.25
OS_AXIS: 012
OS_VPRISM_DIRECTION: PROGS
OS_OVR_VA: 20/
OD_ADD: +2.25
OS_AXIS: 004
OD_SPHERE: +0.75
OS_VPRISM_DIRECTION: PROGS
OD_AXIS: 125
OD_ADD: +2.25
OS_AXIS: 001
OS_OVR_VA: 20/
OD_OVR_VA: 20/
OD_AXIS: 169
OD_SPHERE: +0.25
OD_CYLINDER: -1.25
OD_AXIS: 172
OS_OVR_VA: 20/
OS_SPHERE: +0.25
OD_OVR_VA: 20/
OS_ADD: +2.50
OS_ADD: +2.50
OS_ADD: +2.25
OD_VPRISM_DIRECTION: PROGS
OD_CYLINDER: -1.25
OD_CYLINDER: -1.25
OD_ADD: +2.50
OD_SPHERE: +0.50
OD_AXIS: 171
OS_VPRISM_DIRECTION: PROGS
OS_ADD: +2.75
OD_ADD: +2.50
OS_ADD: +2.25
OS_ADD: +2.25
OS_SPHERE: +0.50
OD_OVR_VA: 20/
OS_CYLINDER: -0.50
OS_SPHERE: +0.50
OD_AXIS: 171
OD_CYLINDER: -1.25
OD_ADD: +2.50
OS_CYLINDER: -0.50
OD_ADD: +2.75
OS_CYLINDER: -0.50
OD_VPRISM_DIRECTION: PROGS
OS_SPHERE: +0.25
OD_AXIS: 001
OS_CYLINDER: -0.50
OS_AXIS: 179
OD_AXIS: 171
OD_SPHERE: +0.50
OS_SPHERE: +0.50
OS_SPHERE: +0.25

## 2025-06-12 ASSESSMENT — REFRACTION_MANIFEST
OS_VA1: 20/25-2
OS_AXIS: 010
OS_CYLINDER: -0.50
OS_AXIS: 010
OU_VA: 20/20
OD_SPHERE: +0.50
OD_CYLINDER: -1.25
OD_CYLINDER: -1.25
OD_VA1: 20/25+3
OD_ADD: +2.75
OS_ADD: +2.75
OD_ADD: +2.75
OD_AXIS: 175
OS_VA1: 20/25-2
OD_AXIS: 175
OS_ADD: +2.75
OD_VA1: 20/25+3
OU_VA: 20/20
OS_SPHERE: +0.25
OS_CYLINDER: -0.50
OS_SPHERE: +0.25
OD_SPHERE: +0.50

## 2025-06-12 ASSESSMENT — CONFRONTATIONAL VISUAL FIELD TEST (CVF)
OS_FINDINGS: FULL
OD_FINDINGS: FULL

## 2025-06-12 ASSESSMENT — KERATOMETRY
METHOD_AUTO_MANUAL: AUTO
OS_K1POWER_DIOPTERS: 45.75
OD_AXISANGLE_DEGREES: 085
OS_K2POWER_DIOPTERS: 46.50
OD_K1POWER_DIOPTERS: 45.50
OS_AXISANGLE_DEGREES: 085
OD_K2POWER_DIOPTERS: 47.00

## 2025-06-12 ASSESSMENT — REFRACTION_AUTOREFRACTION
OS_CYLINDER: -0.25
OD_CYLINDER: -1.00
OS_SPHERE: +0.25
OS_AXIS: 007
OD_SPHERE: +0.50
OD_AXIS: 171

## 2025-06-12 ASSESSMENT — CORNEAL DYSTROPHY - POSTERIOR
OS_POSTERIORDYSTROPHY: T GUTTATA
OD_POSTERIORDYSTROPHY: T GUTTATA

## 2025-06-12 ASSESSMENT — VISUAL ACUITY
OD_BCVA: 20/20-1
OS_BCVA: 20/20

## 2025-06-12 ASSESSMENT — LID EXAM ASSESSMENTS
OS_BLEPHARITIS: LUL 1+
OD_BLEPHARITIS: RUL 1+

## 2025-07-14 ENCOUNTER — OFFICE (OUTPATIENT)
Facility: LOCATION | Age: 80
Setting detail: OPHTHALMOLOGY
End: 2025-07-14
Payer: MEDICARE

## 2025-07-14 DIAGNOSIS — H52.7: ICD-10-CM

## 2025-07-14 PROCEDURE — RX/CHECK RX/CHECK: Performed by: STUDENT IN AN ORGANIZED HEALTH CARE EDUCATION/TRAINING PROGRAM

## 2025-07-14 ASSESSMENT — CONFRONTATIONAL VISUAL FIELD TEST (CVF)
OS_FINDINGS: FULL
OD_FINDINGS: FULL

## 2025-07-29 PROBLEM — H52.7 REFRACTIVE ERROR: Status: ACTIVE | Noted: 2025-07-14

## 2025-07-29 ASSESSMENT — REFRACTION_CURRENTRX
OS_CYLINDER: -0.75
OS_ADD: +2.25
OS_CYLINDER: -0.50
OD_CYLINDER: -1.25
OD_AXIS: 001
OS_SPHERE: +0.25
OD_CYLINDER: -0.25
OD_ADD: +2.25
OS_SPHERE: +0.50
OD_SPHERE: +0.50
OD_SPHERE: +0.25
OS_ADD: +2.75
OS_CYLINDER: -0.50
OS_VPRISM_DIRECTION: PROGS
OD_OVR_VA: 20/
OS_VPRISM_DIRECTION: PROGS
OD_CYLINDER: -1.25
OD_ADD: +2.75
OD_SPHERE: +0.50
OD_VPRISM_DIRECTION: PROGS
OD_CYLINDER: -1.25
OD_CYLINDER: -1.25
OS_OVR_VA: 20/
OS_ADD: +2.25
OD_SPHERE: +0.50
OD_CYLINDER: -1.25
OS_CYLINDER: -0.50
OS_ADD: +2.00
OS_ADD: +2.25
OS_ADD: +2.50
OD_VPRISM_DIRECTION: PROGS
OS_OVR_VA: 20/
OS_SPHERE: +0.50
OD_ADD: +2.50
OD_SPHERE: +0.50
OD_AXIS: 169
OS_ADD: +2.25
OD_SPHERE: +0.50
OS_VPRISM_DIRECTION: PROGS
OS_AXIS: 001
OD_CYLINDER: -1.25
OD_ADD: +2.50
OD_AXIS: 171
OS_AXIS: 003
OS_AXIS: 009
OD_VPRISM_DIRECTION: PROGS
OS_ADD: +2.50
OS_AXIS: 004
OD_ADD: +2.25
OS_SPHERE: +0.50
OD_OVR_VA: 20/
OS_VPRISM_DIRECTION: PROGS
OD_AXIS: 171
OD_AXIS: 172
OS_CYLINDER: 0.00
OS_SPHERE: +0.50
OS_CYLINDER: -0.50
OS_SPHERE: +0.25
OD_ADD: +2.25
OS_CYLINDER: -0.75
OS_OVR_VA: 20/
OD_AXIS: 176
OD_AXIS: 125
OS_AXIS: 179
OD_CYLINDER: -1.25
OD_OVR_VA: 20/
OS_AXIS: 012
OS_AXIS: 010
OD_AXIS: 171
OD_VPRISM_DIRECTION: PROGS
OD_SPHERE: +0.75
OS_CYLINDER: -0.50
OD_ADD: +2.25
OS_AXIS: 004
OD_SPHERE: +0.50
OS_SPHERE: +0.25
OD_ADD: +2.50
OS_SPHERE: +0.25

## 2025-07-29 ASSESSMENT — KERATOMETRY
OD_K2POWER_DIOPTERS: 47.25
METHOD_AUTO_MANUAL: AUTO
OS_K1POWER_DIOPTERS: 45.75
OD_K1POWER_DIOPTERS: 45.50
OD_AXISANGLE_DEGREES: 088
OS_AXISANGLE_DEGREES: 087
OS_K2POWER_DIOPTERS: 46.75

## 2025-07-29 ASSESSMENT — REFRACTION_AUTOREFRACTION
OD_CYLINDER: -1.00
OS_CYLINDER: -0.50
OS_SPHERE: +0.50
OD_SPHERE: +0.50
OD_AXIS: 178
OS_AXIS: 011

## 2025-07-29 ASSESSMENT — REFRACTION_MANIFEST
OS_ADD: +2.25
OS_VA2: 20/20
OD_SPHERE: +0.50
OS_ADD: +2.75
OS_AXIS: 180
OD_CYLINDER: -1.00
OS_VA1: 20/25-2
OD_AXIS: 176
OD_VA1: 20/20
OD_ADD: +2.75
OS_CYLINDER: -0.50
OD_VA1: 20/25+3
OS_VA1: 20/25-2
OS_VA1: 20/20-
OS_SPHERE: +0.25
OD_VA1: 20/25+3
OD_SPHERE: +0.50
OS_SPHERE: +0.50
OD_ADD: +2.75
OD_ADD: +2.25
OD_CYLINDER: -1.25
OS_SPHERE: +0.25
OS_AXIS: 010
OS_CYLINDER: -0.50
OD_CYLINDER: -1.25
OD_AXIS: 175
OU_VA: 20/20
OS_ADD: +2.75
OU_VA: 20/20
OD_AXIS: 175
OS_AXIS: 010
OS_CYLINDER: -0.50
OD_SPHERE: +0.50
OD_VA2: 20/20

## 2025-07-29 ASSESSMENT — VISUAL ACUITY
OD_BCVA: 20/25+2
OS_BCVA: 20/20